# Patient Record
Sex: FEMALE | Race: WHITE
[De-identification: names, ages, dates, MRNs, and addresses within clinical notes are randomized per-mention and may not be internally consistent; named-entity substitution may affect disease eponyms.]

---

## 2017-10-14 ENCOUNTER — HOSPITAL ENCOUNTER (EMERGENCY)
Dept: HOSPITAL 10 - FTE | Age: 37
Discharge: LEFT BEFORE DISCHARGE | End: 2017-10-14
Payer: SELF-PAY

## 2017-10-14 VITALS
HEIGHT: 65 IN | WEIGHT: 167.55 LBS | BODY MASS INDEX: 27.92 KG/M2 | WEIGHT: 167.55 LBS | HEIGHT: 65 IN | BODY MASS INDEX: 27.92 KG/M2

## 2017-10-14 DIAGNOSIS — F41.9: Primary | ICD-10-CM

## 2017-10-14 DIAGNOSIS — R07.9: ICD-10-CM

## 2017-10-14 PROCEDURE — 93005 ELECTROCARDIOGRAM TRACING: CPT

## 2017-10-14 NOTE — ERD
ER Documentation


Chief Complaint


Date/Time


DATE: 10/14/17 


TIME: 13:09


Chief Complaint


face tingling started today; no facial droop and no slurred speech





HPI


37-year-old female who presented emergency department with a complaint of 

facial tingling that started around 10 AM today.  Stated that she is in a lot 

of stress at work and also anxious.  Also added that she did not have much 

sleep for the past few days due to her work schedule.





Denies headache, head trauma, dizziness, blurry vision, changes in vision, 

throat pain, difficulty swallowing, neck pain, neck stiffness, shoulder pain, 

chest pain, back pain, abdominal pain, nausea, vomiting, constipation, diarrhea

, urinary symptoms, loss of bowel and bladder control, difficulty walking, fever

, chills.  





No known drug allergies.  No past medical history.  Surgical history of 

bilateral breast implant, liposuction.  Medication: Denies taking any 

prescription medications at home.  Social: Works as a .  LMP: Stated it 

was 4 weeks ago.   A1.





ROS


All systems reviewed and are negative except as per history of present illness.





Physical Exam


Vitals





Vital Signs








  Date Time  Temp Pulse Resp B/P Pulse Ox O2 Delivery O2 Flow Rate FiO2


 


10/14/17 12:58 97.2 80 16 122/81 99   








Physical Exam


Const:  []


Head:   Atraumatic 


Eyes:    Normal Conjunctiva.  No pain in eye movement.  Extraocular movement of 

her eyes within normal limits.


ENT:    Normal External Ears, Nose and Mouth.


Neck:               Full range of motion..~ No meningismus.


Resp:    Clear to auscultation bilaterally


Cardio:    Regular rate and rhythm, no murmurs


Abd:    Soft, non tender, non distended. Normal bowel sounds


Skin:    No petechiae or rashes


Back:    No midline or flank tenderness


Ext:    No cyanosis, or edema


Neur:    Awake and alert. Alert and oriented 4.  No facial droop.  Able to 

control tongue movement.  Lung sounds are clear to auscultation.  Cranial 

nerves II through XII are intact.  Romberg test is negative.


Psych:    Normal Mood and Affect





Procedures/MDM


37-year-old female who presented emergency department with a complaint of 

facial tingling that started around 10 AM today.  Stated that she is in a lot 

of stress at work and also anxious.  Also added that she did not have much 

sleep for the past few days due to her work schedule.





Denies headache, head trauma, dizziness, blurry vision, changes in vision, 

throat pain, difficulty swallowing, neck pain, neck stiffness, shoulder pain, 

chest pain, back pain, abdominal pain, nausea, vomiting, constipation, diarrhea

, urinary symptoms, loss of bowel and bladder control, difficulty walking, fever

, chills.  





No known drug allergies.  No past medical history.  Surgical history of 

bilateral breast implant, liposuction.  Medication: Denies taking any 

prescription medications at home.  Social: Works as a .  LMP: Stated it 

was 4 weeks ago.   A1.





Physical exam: Alert and oriented 4.  No facial droop.  Able to control tongue 

movement.  Lung sounds are clear to auscultation.  Cranial nerves II through 

XII are intact.  Romberg test is negative.





Disease process was explained to the patient.  She verbalized understanding and 

agreed with the diagnostic test, treatment, plan of care, follow-up care.





EKG:





Reevaluation: Cranial nerves II through XII intact.  Romberg test negative.





Differential diagnosis: Stroke versus Bell's palsy versus paresthesias versus 

anxiety





Final diagnosis: Anxiety





Prescription: Hydroxyzine.  Ativan.





Follow-up with primary care physician the next 24-48 hours.  Come back to 

emergency department for any new symptoms or any worsening of symptoms.  All 

questions and concerns are answered.  Patient verbalized understanding and 

agreed with the plan of care.





Hemodynamically stable on discharge.





Departure


Diagnosis:  


 Primary Impression:  


 Anxiety


Condition:  Stable





Additional Instructions:  


Follow-up with primary care physician the next 24-48 hours.  Come back to 

emergency department for any new symptoms or any worsening of symptoms.  All 

questions and concerns are answered.  Patient verbalized understanding and 

agreed with the plan of care.











MO APPLE Oct 14, 2017 13:19

## 2018-02-22 ENCOUNTER — HOSPITAL ENCOUNTER (EMERGENCY)
Age: 38
Discharge: HOME | End: 2018-02-22

## 2018-02-22 ENCOUNTER — HOSPITAL ENCOUNTER (EMERGENCY)
Dept: HOSPITAL 91 - FTE | Age: 38
Discharge: HOME | End: 2018-02-22
Payer: MEDICAID

## 2018-02-22 DIAGNOSIS — J02.9: Primary | ICD-10-CM

## 2018-02-22 PROCEDURE — 99283 EMERGENCY DEPT VISIT LOW MDM: CPT
